# Patient Record
Sex: MALE | Race: WHITE
[De-identification: names, ages, dates, MRNs, and addresses within clinical notes are randomized per-mention and may not be internally consistent; named-entity substitution may affect disease eponyms.]

---

## 2017-11-08 ENCOUNTER — HOSPITAL ENCOUNTER (OUTPATIENT)
Dept: HOSPITAL 63 - SURG | Age: 62
Discharge: HOME | End: 2017-11-08
Attending: ANESTHESIOLOGY
Payer: OTHER GOVERNMENT

## 2017-11-08 DIAGNOSIS — M54.12: Primary | ICD-10-CM

## 2017-11-08 DIAGNOSIS — M50.323: ICD-10-CM

## 2017-11-08 PROCEDURE — 99213 OFFICE O/P EST LOW 20 MIN: CPT

## 2019-09-03 ENCOUNTER — HOSPITAL ENCOUNTER (OUTPATIENT)
Dept: HOSPITAL 63 - US | Age: 64
Discharge: HOME | End: 2019-09-03
Attending: FAMILY MEDICINE
Payer: OTHER GOVERNMENT
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
## 2019-09-03 DIAGNOSIS — R22.32: Primary | ICD-10-CM
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
## 2019-09-03 PROCEDURE — 93971 EXTREMITY STUDY: CPT

## 2019-09-03 NOTE — RAD
VENOUS DUPLEX LEFT UPPER EXTREMITY

 

Left upper extremity venous duplex was performed using B-mode, color-flow,

and spectral Doppler.

 

Indication: Left arm swelling, fall, pain

 

Findings: The left internal jugular, subclavian, axillary, brachial, 

basilic, and cephalic veins as well as the left ulnar and radial veins 

were assessed for patency. All the vessels were found to be compressible 

and demonstrated phasic, competent to flow with normal augmentation. 

 

Impression:  Negative study for acute DVT of the left upper extremity.

 

Electronically signed by: Bello Thomas MD (9/3/2019 1:01 PM) [id number]

## 2021-02-26 ENCOUNTER — HOSPITAL ENCOUNTER (OUTPATIENT)
Dept: HOSPITAL 63 - LAB | Age: 66
End: 2021-02-26
Attending: NURSE ANESTHETIST, CERTIFIED REGISTERED
Payer: OTHER GOVERNMENT

## 2021-02-26 DIAGNOSIS — Z01.812: Primary | ICD-10-CM

## 2021-02-26 DIAGNOSIS — Z20.822: ICD-10-CM

## 2021-02-26 DIAGNOSIS — Z86.010: ICD-10-CM

## 2021-02-26 PROCEDURE — U0003 INFECTIOUS AGENT DETECTION BY NUCLEIC ACID (DNA OR RNA); SEVERE ACUTE RESPIRATORY SYNDROME CORONAVIRUS 2 (SARS-COV-2) (CORONAVIRUS DISEASE [COVID-19]), AMPLIFIED PROBE TECHNIQUE, MAKING USE OF HIGH THROUGHPUT TECHNOLOGIES AS DESCRIBED BY CMS-2020-01-R: HCPCS

## 2021-03-02 ENCOUNTER — HOSPITAL ENCOUNTER (OUTPATIENT)
Dept: HOSPITAL 63 - SURG | Age: 66
Discharge: HOME | End: 2021-03-02
Attending: INTERNAL MEDICINE
Payer: OTHER GOVERNMENT

## 2021-03-02 VITALS — SYSTOLIC BLOOD PRESSURE: 157 MMHG | DIASTOLIC BLOOD PRESSURE: 75 MMHG

## 2021-03-02 DIAGNOSIS — R19.5: Primary | ICD-10-CM

## 2021-03-02 DIAGNOSIS — Z83.71: ICD-10-CM

## 2021-03-02 DIAGNOSIS — K57.30: ICD-10-CM

## 2021-03-02 DIAGNOSIS — Z86.010: ICD-10-CM

## 2021-03-02 DIAGNOSIS — Z80.0: ICD-10-CM

## 2021-03-02 DIAGNOSIS — Z79.899: ICD-10-CM

## 2021-03-02 DIAGNOSIS — K64.8: ICD-10-CM

## 2021-03-02 DIAGNOSIS — Z72.89: ICD-10-CM

## 2021-03-02 PROCEDURE — 45378 DIAGNOSTIC COLONOSCOPY: CPT

## 2021-09-06 ENCOUNTER — HOSPITAL ENCOUNTER (EMERGENCY)
Dept: HOSPITAL 63 - ER | Age: 66
Discharge: HOME | End: 2021-09-06
Payer: MEDICARE

## 2021-09-06 VITALS — BODY MASS INDEX: 28.91 KG/M2 | WEIGHT: 201.94 LBS | HEIGHT: 70 IN

## 2021-09-06 VITALS — DIASTOLIC BLOOD PRESSURE: 83 MMHG | SYSTOLIC BLOOD PRESSURE: 137 MMHG
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
## 2021-09-06 DIAGNOSIS — S00.211A: Primary | ICD-10-CM

## 2021-09-06 PROCEDURE — 99283 EMERGENCY DEPT VISIT LOW MDM: CPT

## 2021-09-06 NOTE — PHYS DOC
Past History


Past Surgical History:  Other


Additional Past Surgical Histo:  discectomy, LASIK


Alcohol Use:  Heavy





General Adult


EDM:


Chief Complaint:  EYE PROBLEMS





HPI:


HPI:


Patient is a 65-year-old male being seen in the ER for possible foreign body to 

right eye. 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 

some dust fall into his right eye. Patient states that he flushed it with saline

and applied eyedrops. He woke up this morning and had developed increased eyelid

swelling, crusting/drainage and redness. Patient denies any eye pain, vision 

changes, nasal congestion or drainage.





Review of Systems:


Review of Systems:


14 body systems of the review of systems have been reviewed.  See HPI for 

pertinent positive and negative responses, otherwise all other systems are 

negative, nonpertinent or noncontributory





Current Medications:


Current Meds:





Current Medications








 Medications


  (Trade)  Dose


 Ordered  Sig/Leroy  Start Time


 Stop Time Status Last Admin


Dose Admin


 


 Fluorescein Sodium


  (Ful-Stacy 1mg)  1 strip  1X  ONCE  9/6/21 11:30


 9/6/21 11:31   





 


 Tetracaine HCl


  (Tetracaine)  1 drop  1X  ONCE  9/6/21 11:30


 9/6/21 11:31   














Allergies:


Allergies:





Allergies








Coded Allergies Type Severity Reaction Last Updated Verified


 


  No Known Drug Allergies    3/2/21 No











Physical Exam:


PE:





Constitutional: Well developed, well nourished, no acute distress, non-toxic 

appearance. []


HENT: Normocephalic, atraumatic, bilateral external ears normal, oropharynx 

moist, no oral exudates, nose normal. []


Eyes: PERRLA, pupils 4 mm bilaterally, EOMI, right eyelid swelling with redness,

 redness noted to conjunctiva, tearing


Neck: Normal range of motion, no stridor


Cardiovascular:Heart rate regular rhythm, no murmur []


Lungs & Thorax:  Bilateral breath sounds clear to auscultation []


Abdomen: Bowel sounds normal, soft, no tenderness, no masses, no pulsatile 

masses. [] 


Skin: Warm, dry, no erythema, no rash. [] 


Back: Normal range of motion


Extremities: No tenderness, no cyanosis, no clubbing, ROM intact, no edema. [] 


Neurologic: Alert and oriented X 3, normal motor function, normal sensory 

function, no focal deficits noted. []


Psychologic: Affect normal, judgement normal, mood normal. []





Current Patient Data:


Vital Signs:





                                   Vital Signs








  Date Time  Temp Pulse Resp B/P (MAP) Pulse Ox O2 Delivery O2 Flow Rate FiO2


 


9/6/21 11:00 98.1 77 16 137/83 97   











EKG:


EKG:


[]





Radiology/Procedures:


Radiology/Procedures:


[]





Heart Score:


C/O Chest Pain:  No


Risk Factors:


Risk Factors:  DM, Current or recent (<one month) smoker, HTN, HLP, family 

history of CAD, obesity.


Risk Scores:


Score 0 - 3:  2.5% MACE over next 6 weeks - Discharge Home


Score 4 - 6:  20.3% MACE over next 6 weeks - Admit for Clinical Observation


Score 7 - 10:  72.7% MACE over next 6 weeks - Early Invasive Strategies





Course & Med Decision Making:


Course & Med Decision Making


Pertinent Labs and Imaging studies reviewed. (See chart for details)





Patient is a 65-year-old male being seen in the ER for right eyelid swelling and

 redness with drainage after possible foreign body in eye. Tetracaine and 

fluorescein used to visualize any possible abrasions or foreign bodies.  Small 

scleral abrasion notedto bottom right portion of eye.  Patient to be discharged 

home with erythromycin ointment and follow-up with an ophthalmologist.  I 

discussed with patient all findings and diagnostic testing as well as the need 

to follow-up with PCP for further evaluation and treatment or return to the ER 

if any new or worsening symptoms.  Strict return precautions were also discussed

 at length.  Patient voiced understanding and agreement with the plan.  Patient 

is hemodynamically stable at the time of disposition.





Dragon Disclaimer:


Dragon Disclaimer:


This electronic medical record was generated, in whole or in part, using a voice

 recognition dictation system.





Departure


Departure:


Impression:  


   Primary Impression:  


   Superficial abrasion of eye region structure


   Qualified Codes:  S00.211A - Abrasion of right eyelid and periocular area, 

   initial encounter


Disposition:  01 HOME / SELF CARE / HOMELESS


Condition:  GOOD


Referrals:  


BHAVIN HERNÁNDEZ MD (PCP)





Additional Instructions:  


You were seen in the ER today for possible foreign body to your right eye. We 

used medication to visualize any possible foreign bodies or abrasions to your 

right eye. There was a small abrasion to your eye. You are being discharged home

 with an antibiotic eye ointment to use. Please use this as directed. Please 

follow-up with your primary care provider tomorrow regarding your ER visit. You 

may need to follow-up with ophthalmology. If you do not have an ophthalmologist 

you can call with [facility] at [phone] to set up an appointment 

within the next week. If you develop any worsening of your eye pain, increased 

swelling, vision changes, headache, high fevers refractory to treatment, 

intractable nausea or vomiting please return to the ER.





EMERGENCY DEPARTMENT GENERAL DISCHARGE INSTRUCTIONS





Thank you for coming to [facility] (ED) today and trusting us

 with you 


care.  We trust that you had a positivie experience in our Emergency Department.

  If you 


wish to speak to the department management, you may call the director at 

[phone].





YOUR FOLLOW UP INSTRUCTIONS ARE AS FOLLOWS:





1.  Do you have a private Doctor?  If you do not have a private doctor, please 

ask for a 


resource list of physicians or clinics that may be able to assist you with 

follow up care.





2.  The Emergency Physician has interpreted your x-rays.  The X-Ray specialist 

will also 


review them.  If there is a change in the findings, you will be notified in 48 

hours when at 


all possible.





3.  A lab test or culture has been done, your results will be reviewed and you 

will be 


notified if you need a change in treatment.





ADDITIONAL INSTRUCTIONS AND INFORMATION:





1.  Your care today has been supervised by a physician who is specially trained 

in emergency 


care.  Many problems require more than one evaluation for a complete diagnosis 

and 


treatment.  We recommend that you schedule your follow up appointment as 

recommended to 


ensure complete treatment of you illness or injury.  If you are unable to obtain

 follow up 


care and continue to have a problem, or if your condition worsens, we recommend 

that you 


return to the ED.





2.  We are not able to safely determine your condition over the phone nor are we

 able to 


give sound medical advice over the phone.  For these safety reasons, if you call

 for medical 


advice we will ask you to come to the ED for further evaluation.





3.  If you have any questions regarding these discharge instructions please call

 the ED at 


[phone].





SAFETY INFORMATION:





In the interest of safety, wellness, and injury prevention; we encourage you to 

wear your 


sealbelt, if you smoke; quite smoking, and we encourage family to use a 

protective helmet 


for bicycling and other sporting events that present an increased risk for head 

injury.





IF YOUR SYMPTOMS WORSEN OR NEW SYMPTOMS DEVELOP, OR YOU HAVE CONCERNS ABOUT YOUR

 CONDITION; 


OR IF YOUR CONDITION WORSENS WHILE YOU ARE WAITING FOR YOUR FOLLOW UP 

APPOINTMENT; EITHER 


CONTACT YOUR PRIMARY CARE DOCTOR, THE PHYSICIAN WHOSE NAME AND NUMBER YOU WERE 

GIVEN, OR 


RETURN TO THE ED IMMEDIATELY.


Scripts


Erythromycin Base (Erythromycin) 1 Gm Oint...g.


1 GM OD QID for conjunctivitis for 7 Days, #1 MISC 0 Refills


   Apply 1/2 inch long eyelid 4 times a day for 7 days.


   Prov: KYAW ASKEW         9/6/21











KYAW ASKEW           Sep 6, 2021 11:35